# Patient Record
Sex: FEMALE | Race: WHITE | URBAN - METROPOLITAN AREA
[De-identification: names, ages, dates, MRNs, and addresses within clinical notes are randomized per-mention and may not be internally consistent; named-entity substitution may affect disease eponyms.]

---

## 2020-11-05 ENCOUNTER — OFFICE VISIT (OUTPATIENT)
Dept: URBAN - METROPOLITAN AREA CLINIC 31 | Facility: CLINIC | Age: 85
End: 2020-11-05
Payer: MEDICARE

## 2020-11-05 PROCEDURE — 92134 CPTRZ OPH DX IMG PST SGM RTA: CPT | Performed by: OPHTHALMOLOGY

## 2020-11-05 PROCEDURE — 92014 COMPRE OPH EXAM EST PT 1/>: CPT | Performed by: OPHTHALMOLOGY

## 2020-11-05 ASSESSMENT — INTRAOCULAR PRESSURE
OS: 12
OD: 13

## 2020-11-05 NOTE — IMPRESSION/PLAN
Impression: Vitreous degeneration, bilateral: H43.813. Plan: Patient notes floaters. Exam demonstrates a PVD without any RD or RT on exam.  The floaters are not debilitating to the patient and do not warrant surgery. Reassurance provided. Precautions discussed with the patient.

## 2020-11-05 NOTE — IMPRESSION/PLAN
Impression: Exudative age-rel mclr degn, bi, with actv chrdl neovas: H35.3231. Bilateral. Status: Symptomatic.
h/o Eylea OU q3m in [place], s/p last Eylea 06/2020 OD: atrophy with SRH
OS: drusen and RPE change
-s/p Avastin 10/01/20 Plan: Exam and OCT demonstrate continued SRH OD and tr SRF/IRF OS. Recommend switch to intravitreal Eylea injection today OU. R/B/A discussed and patient elects to proceed. Intravitreal Eylea injection was performed in both eyes in clinic without complication.   

RTC 4-6 weeks re-eval with OCT OU, possible Eylea

## 2020-11-05 NOTE — IMPRESSION/PLAN
Impression: Age-related nuclear cataract, bilateral: H25.13. Bilateral. Status: Symptomatic. Plan: Vision tested worse; Exam demonstrates a worse cataract on exam. Patient is cleared from a retina standpoint to proceed with CE/IOL. Discussed that prognosis is guarded OD>OS given AMD status.

## 2020-12-10 ENCOUNTER — OFFICE VISIT (OUTPATIENT)
Dept: URBAN - METROPOLITAN AREA CLINIC 31 | Facility: CLINIC | Age: 85
End: 2020-12-10
Payer: MEDICARE

## 2020-12-10 PROCEDURE — 92134 CPTRZ OPH DX IMG PST SGM RTA: CPT | Performed by: OPHTHALMOLOGY

## 2020-12-10 PROCEDURE — 92012 INTRM OPH EXAM EST PATIENT: CPT | Performed by: OPHTHALMOLOGY

## 2020-12-10 ASSESSMENT — INTRAOCULAR PRESSURE
OS: 17
OD: 21

## 2020-12-10 NOTE — IMPRESSION/PLAN
Impression: Age-related nuclear cataract, bilateral: H25.13. Bilateral. Status: Symptomatic. Plan: Exam demonstrates a worse cataract on exam. Patient is cleared from a retina standpoint to proceed with CE/IOL. Discussed that prognosis is guarded OD>OS given AMD status. Patient is scheduled for cataract surgery OD then OS over the next few weeks.

## 2020-12-10 NOTE — IMPRESSION/PLAN
Impression: Exudative age-rel mclr degn, bi, with actv chrdl neovas: H35.3231. Bilateral. Status: Symptomatic.
h/o Eylea OU q3m in [place], s/p last Eylea 11/05/20 OD: atrophy with SRH
OS: drusen and RPE change
-s/p Avastin 10/01/20 Plan: Exam and OCT demonstrate improved SRH OD and improving tr SRF/IRF OS. Recommend intravitreal Eylea injection today OU. Will follow closely as cataract surgery is upcoming OU. R/B/A discussed and patient elects to proceed. Intravitreal Eylea injection was performed in both eyes in clinic without complication.   

RTC 6 weeks re-eval with OCT OU, possible Eylea

## 2021-01-21 ENCOUNTER — OFFICE VISIT (OUTPATIENT)
Dept: URBAN - METROPOLITAN AREA CLINIC 31 | Facility: CLINIC | Age: 86
End: 2021-01-21
Payer: MEDICARE

## 2021-01-21 DIAGNOSIS — H25.13 AGE-RELATED NUCLEAR CATARACT, BILATERAL: ICD-10-CM

## 2021-01-21 DIAGNOSIS — H35.3231 EXUDATIVE AGE-REL MCLR DEGN, BI, WITH ACTV CHRDL NEOVAS: ICD-10-CM

## 2021-01-21 PROCEDURE — 92014 COMPRE OPH EXAM EST PT 1/>: CPT | Performed by: OPHTHALMOLOGY

## 2021-01-21 PROCEDURE — 92134 CPTRZ OPH DX IMG PST SGM RTA: CPT | Performed by: OPHTHALMOLOGY

## 2021-01-21 ASSESSMENT — INTRAOCULAR PRESSURE
OS: 13
OD: 14

## 2021-01-21 NOTE — IMPRESSION/PLAN
Impression: Presence of intraocular lens: Z96.1. Plan: Lens in good place. Observe. Cleared from retina standpoint to proceed with Mrx.

## 2021-01-21 NOTE — IMPRESSION/PLAN
Impression: Exudative age-rel mclr degn, bi, with actv chrdl neovas: H35.3231. Bilateral. Status: Symptomatic.
h/o Eylea OU q3m in [place], s/p last Eylea 11/05/20 OD: atrophy with SRH
OS: drusen and RPE change
-s/p Avastin 10/01/20 Plan: Exam and OCT demonstrate improved SRH OD and improving tr SRF/IRF OS. Recommend intravitreal Eylea injection today OU. There is no worsening with cataract surgery OU. R/B/A discussed and patient elects to proceed. Intravitreal Eylea injection was performed in both eyes in clinic without complication.   

RTC 6 weeks re-eval with OCT OU, possible Eylea

## 2021-03-04 ENCOUNTER — OFFICE VISIT (OUTPATIENT)
Dept: URBAN - METROPOLITAN AREA CLINIC 31 | Facility: CLINIC | Age: 86
End: 2021-03-04
Payer: MEDICARE

## 2021-03-04 PROCEDURE — 92134 CPTRZ OPH DX IMG PST SGM RTA: CPT | Performed by: OPHTHALMOLOGY

## 2021-03-04 PROCEDURE — 92012 INTRM OPH EXAM EST PATIENT: CPT | Performed by: OPHTHALMOLOGY

## 2021-03-04 ASSESSMENT — INTRAOCULAR PRESSURE
OD: 8
OS: 8

## 2021-03-04 NOTE — IMPRESSION/PLAN
Impression: Exudative age-rel mclr degn, bi, with actv chrdl neovas: H35.3231. Bilateral. Status: Symptomatic.
h/o Eylea OU q3m in [place], s/p last Eylea RCA 01/21/21 OD: atrophy with SRH
OS: drusen and RPE change
-s/p Avastin 10/01/20 Plan: Exam and OCT demonstrate improved SRH OD and improving tr SRF/IRF OS. Recommend intravitreal Eylea injection today OU and extend next visit to 8 wks. There is no worsening with cataract surgery OU. R/B/A discussed and patient elects to proceed. Intravitreal Eylea injection was performed in both eyes in clinic without complication.   

RTC 8 weeks re-eval with OCT OU, possible Eylea

## 2021-05-06 ENCOUNTER — OFFICE VISIT (OUTPATIENT)
Dept: URBAN - METROPOLITAN AREA CLINIC 31 | Facility: CLINIC | Age: 86
End: 2021-05-06
Payer: MEDICARE

## 2021-05-06 PROCEDURE — 92012 INTRM OPH EXAM EST PATIENT: CPT | Performed by: OPHTHALMOLOGY

## 2021-05-06 PROCEDURE — 92134 CPTRZ OPH DX IMG PST SGM RTA: CPT | Performed by: OPHTHALMOLOGY

## 2021-05-06 ASSESSMENT — INTRAOCULAR PRESSURE
OS: 12
OD: 13

## 2021-05-06 NOTE — IMPRESSION/PLAN
Impression: Presence of intraocular lens: Z96.1.
- Dr. Mehnaz Clemens: Lens in good place. Observe.   Doing well with new Mrx

## 2021-05-06 NOTE — IMPRESSION/PLAN
Impression: Exudative age-rel mclr degn, bi, with actv chrdl neovas: H35.3231. Bilateral. Status: Symptomatic.
h/o Eylea OU q3m in [place], s/p last Eylea last 03/04/2021-RCA
OD: atrophy with SRH
OS: drusen and RPE change
-s/p Avastin 10/01/20 Plan: Exam and OCT demonstrate improved SRH OD and improving tr SRF/IRF OS. Recommend intravitreal Eylea injection today OU and maintaining next visit to 8 wks. There is no worsening with cataract surgery OU. R/B/A discussed and patient elects to proceed. Intravitreal Eylea injection was performed in both eyes in clinic without complication.   

RTC 8 weeks re-eval with OCT OU, possible Eylea

## 2021-07-01 ENCOUNTER — OFFICE VISIT (OUTPATIENT)
Dept: URBAN - METROPOLITAN AREA CLINIC 31 | Facility: CLINIC | Age: 86
End: 2021-07-01
Payer: MEDICARE

## 2021-07-01 PROCEDURE — 92134 CPTRZ OPH DX IMG PST SGM RTA: CPT | Performed by: OPHTHALMOLOGY

## 2021-07-01 PROCEDURE — 92012 INTRM OPH EXAM EST PATIENT: CPT | Performed by: OPHTHALMOLOGY

## 2021-07-01 ASSESSMENT — INTRAOCULAR PRESSURE
OD: 8
OS: 9

## 2021-07-01 NOTE — IMPRESSION/PLAN
Impression: Presence of intraocular lens: Z96.1.
- Dr. Sri Rocha: Lens in good place. Observe.   Doing well with new Mrx Order extended.

## 2021-07-01 NOTE — IMPRESSION/PLAN
Impression: Exudative age-rel mclr degn, bi, with actv chrdl neovas: H35.3231. Bilateral. Status: Symptomatic.
h/o Eylea OU q3m in [place], s/p last Eylea last 05/06/2021-RCA
OD: atrophy with SRH
OS: drusen and RPE change
-s/p Avastin 10/01/20 Plan: Exam and OCT demonstrate improved SRH OD and improving tr SRF/IRF OS. Recommend intravitreal Eylea injection today OU and maintaining next visit to 8 wks. There is no worsening with cataract surgery OU. R/B/A discussed and patient elects to proceed. Intravitreal Eylea injection was performed in both eyes in clinic without complication.   

RTC 8 weeks re-eval with OCT OU, possible Eylea

## 2021-08-26 ENCOUNTER — OFFICE VISIT (OUTPATIENT)
Dept: URBAN - METROPOLITAN AREA CLINIC 31 | Facility: CLINIC | Age: 86
End: 2021-08-26
Payer: MEDICARE

## 2021-08-26 PROCEDURE — 92134 CPTRZ OPH DX IMG PST SGM RTA: CPT | Performed by: OPHTHALMOLOGY

## 2021-08-26 PROCEDURE — 92012 INTRM OPH EXAM EST PATIENT: CPT | Performed by: OPHTHALMOLOGY

## 2021-08-26 ASSESSMENT — INTRAOCULAR PRESSURE
OD: 10
OS: 10

## 2021-08-26 NOTE — IMPRESSION/PLAN
Impression: Dry eye syndrome of bilateral lacrimal glands: H04.123. Plan: Patient notes irritation. Exam demonstrates worse PEE. Discussed R/B/A of ATs, PFATs, Restasis, vs punctal plugs.  Rec ATs

## 2021-08-26 NOTE — IMPRESSION/PLAN
Impression: Presence of intraocular lens: Z96.1.
- Dr. Oleary Dom: Lens in good place. Observe.   Doing well with new Mrx

## 2021-08-26 NOTE — IMPRESSION/PLAN
Impression: Exudative age-rel mclr degn, bi, with actv chrdl neovas: H35.3231. Bilateral. Status: Symptomatic.
h/o Eylea OU q3m in [place], s/p last Eylea last 07/01/2021-RCA
OD: atrophy with SRH
OS: drusen and RPE change
-s/p Avastin 10/01/20 Plan: Exam and OCT demonstrate improved SRH OD and improving tr SRF/IRF OS. Recommend intravitreal Eylea injection today OU and maintaining next visit to 8 wks. There is no worsening with cataract surgery OU. R/B/A discussed and patient elects to proceed. Intravitreal Eylea injection was performed in both eyes in clinic without complication.   

RTC 8 weeks re-eval with OCT OU, possible Eylea

## 2021-10-21 ENCOUNTER — OFFICE VISIT (OUTPATIENT)
Dept: URBAN - METROPOLITAN AREA CLINIC 31 | Facility: CLINIC | Age: 86
End: 2021-10-21
Payer: MEDICARE

## 2021-10-21 PROCEDURE — 92134 CPTRZ OPH DX IMG PST SGM RTA: CPT | Performed by: OPHTHALMOLOGY

## 2021-10-21 PROCEDURE — 92012 INTRM OPH EXAM EST PATIENT: CPT | Performed by: OPHTHALMOLOGY

## 2021-10-21 ASSESSMENT — INTRAOCULAR PRESSURE
OS: 12
OD: 11

## 2021-10-21 NOTE — IMPRESSION/PLAN
Impression: Presence of intraocular lens: Z96.1.
- Dr. Tulio Bagley: Lens in good place. Observe.   Doing well with new Mrx

## 2021-10-21 NOTE — IMPRESSION/PLAN
Impression: Exudative age-rel mclr degn, bi, with actv chrdl neovas: H35.3231. Bilateral. Status: Symptomatic.
h/o Eylea OU q3m in [place], s/p last Eylea last 08/26/2021-RCA
OD: atrophy with SRH
OS: drusen and RPE change
-s/p Avastin 10/01/20 Plan: Exam and OCT demonstrate improved SRH OD and improving tr SRF/IRF OS. Recommend intravitreal Eylea injection today OU and maintaining next visit to 8 wks. There is no worsening with cataract surgery OU. R/B/A discussed and patient elects to proceed. Intravitreal Eylea injection was performed in both eyes in clinic without complication.   

RTC 8 weeks re-eval with OCT OU, possible Eylea

## 2021-12-16 ENCOUNTER — OFFICE VISIT (OUTPATIENT)
Dept: URBAN - METROPOLITAN AREA CLINIC 31 | Facility: CLINIC | Age: 86
End: 2021-12-16
Payer: MEDICARE

## 2021-12-16 DIAGNOSIS — H43.813 VITREOUS DEGENERATION, BILATERAL: ICD-10-CM

## 2021-12-16 DIAGNOSIS — H04.123 DRY EYE SYNDROME OF BILATERAL LACRIMAL GLANDS: ICD-10-CM

## 2021-12-16 PROCEDURE — 92134 CPTRZ OPH DX IMG PST SGM RTA: CPT | Performed by: OPHTHALMOLOGY

## 2021-12-16 PROCEDURE — 92012 INTRM OPH EXAM EST PATIENT: CPT | Performed by: OPHTHALMOLOGY

## 2021-12-16 ASSESSMENT — INTRAOCULAR PRESSURE
OS: 13
OD: 12

## 2021-12-16 NOTE — IMPRESSION/PLAN
Impression: Exudative age-rel mclr degn, bi, with actv chrdl neovas: H35.3231. Bilateral. Status: Symptomatic.
h/o Eylea OU q3m in [place], s/p last Eylea last 10/21/2021-RCA
OD: atrophy with SRH
OS: drusen and RPE change
-s/p Avastin 10/01/20 Plan: Exam and OCT demonstrate improved SRH OD and improving tr SRF/IRF OS. Recommend intravitreal Eylea injection today OU and extend next visit to 10 wks. There is no worsening with cataract surgery OU. R/B/A discussed and patient elects to proceed. Intravitreal Eylea injection was performed in both eyes in clinic without complication.   

RTC 10 weeks re-eval with OCT OU, possible Eylea

## 2021-12-16 NOTE — IMPRESSION/PLAN
Impression: Presence of intraocular lens: Z96.1.
- Dr. Gonzalez Lady: Lens in good place. Observe.   Doing well with new Mrx

## 2022-02-24 ENCOUNTER — OFFICE VISIT (OUTPATIENT)
Dept: URBAN - METROPOLITAN AREA CLINIC 31 | Facility: CLINIC | Age: 87
End: 2022-02-24
Payer: MEDICARE

## 2022-02-24 DIAGNOSIS — Z96.1 PRESENCE OF INTRAOCULAR LENS: ICD-10-CM

## 2022-02-24 PROCEDURE — 99214 OFFICE O/P EST MOD 30 MIN: CPT | Performed by: OPHTHALMOLOGY

## 2022-02-24 PROCEDURE — 92134 CPTRZ OPH DX IMG PST SGM RTA: CPT | Performed by: OPHTHALMOLOGY

## 2022-02-24 ASSESSMENT — INTRAOCULAR PRESSURE
OS: 18
OD: 11

## 2022-02-24 NOTE — IMPRESSION/PLAN
Impression: Exudative age-rel mclr degn, bi, with actv chrdl neovas: H35.3231. Bilateral. Status: Symptomatic. ~h/o Eylea OU q3m in [place], 
-s/p last Eylea last 12/16/2021-RCA
OD: atrophy with SRH
OS: drusen and RPE change
-s/p Avastin 10/01/20 Plan: Exam and OCT demonstrate improved SRH OD and improving tr SRF/IRF OS. Recommend intravitreal Eylea injection today OU and extend next visit to 12 wks. There is no worsening with cataract surgery OU. R/B/A discussed and patient elects to proceed. Intravitreal Eylea injection was performed in both eyes in clinic without complication.   

RTC 12 weeks re-eval with OCT OU, possible Eylea

## 2022-02-24 NOTE — IMPRESSION/PLAN
Impression: Presence of intraocular lens: Z96.1.
- Dr. Devi Centeno: Lens in good place. + PCO OS.   Rec follow up with Dr. Salvador Chin

## 2022-05-20 ENCOUNTER — OFFICE VISIT (OUTPATIENT)
Dept: URBAN - METROPOLITAN AREA CLINIC 41 | Facility: CLINIC | Age: 87
End: 2022-05-20
Payer: MEDICARE

## 2022-05-20 DIAGNOSIS — H35.3231 EXUDATIVE AGE-REL MCLR DEGN, BI, WITH ACTV CHRDL NEOVAS: Primary | ICD-10-CM

## 2022-05-20 DIAGNOSIS — H04.123 DRY EYE SYNDROME OF BILATERAL LACRIMAL GLANDS: ICD-10-CM

## 2022-05-20 DIAGNOSIS — H43.813 VITREOUS DEGENERATION, BILATERAL: ICD-10-CM

## 2022-05-20 DIAGNOSIS — Z96.1 PRESENCE OF INTRAOCULAR LENS: ICD-10-CM

## 2022-05-20 PROCEDURE — 92012 INTRM OPH EXAM EST PATIENT: CPT | Performed by: OPHTHALMOLOGY

## 2022-05-20 PROCEDURE — 92134 CPTRZ OPH DX IMG PST SGM RTA: CPT | Performed by: OPHTHALMOLOGY

## 2022-05-20 ASSESSMENT — INTRAOCULAR PRESSURE
OS: 13
OD: 13

## 2022-05-20 NOTE — IMPRESSION/PLAN
Impression: Presence of intraocular lens: Z96.1.
- Dr. David Diaz: Lens in good place. + PCO OS.   Rec follow up with Dr. Zander Zamora

## 2022-05-20 NOTE — IMPRESSION/PLAN
Impression: Exudative age-rel mclr degn, bi, with actv chrdl neovas: H35.3231. Bilateral. Status: Symptomatic. ~h/o Eylea OU q3m in T[place], 
-s/p last Eylea last 2/24/2022-RCA
OD: atrophy with SRH
OS: drusen and RPE change
-s/p Avastin 10/01/20 Plan: Exam and OCT demonstrate improved SRH OD and improving tr SRF/IRF OS. Recommend intravitreal Eylea injection today OU and extend next visit to 14 wks. There is no worsening with cataract surgery OU. R/B/A discussed and patient elects to proceed. Intravitreal Eylea injection was performed in both eyes in clinic without complication.   

RTC 14 weeks re-eval with OCT OU, possible Eylea

## 2022-08-11 ENCOUNTER — OFFICE VISIT (OUTPATIENT)
Dept: URBAN - METROPOLITAN AREA CLINIC 31 | Facility: CLINIC | Age: 87
End: 2022-08-11
Payer: MEDICARE

## 2022-08-11 DIAGNOSIS — Z96.1 PRESENCE OF INTRAOCULAR LENS: ICD-10-CM

## 2022-08-11 DIAGNOSIS — H04.123 DRY EYE SYNDROME OF BILATERAL LACRIMAL GLANDS: ICD-10-CM

## 2022-08-11 DIAGNOSIS — H43.813 VITREOUS DEGENERATION, BILATERAL: ICD-10-CM

## 2022-08-11 DIAGNOSIS — H35.3231 EXUDATIVE AGE-REL MCLR DEGN, BI, WITH ACTV CHRDL NEOVAS: Primary | ICD-10-CM

## 2022-08-11 PROCEDURE — 92012 INTRM OPH EXAM EST PATIENT: CPT | Performed by: OPHTHALMOLOGY

## 2022-08-11 PROCEDURE — 92134 CPTRZ OPH DX IMG PST SGM RTA: CPT | Performed by: OPHTHALMOLOGY

## 2022-08-11 ASSESSMENT — INTRAOCULAR PRESSURE
OD: 13
OS: 13

## 2022-08-11 NOTE — IMPRESSION/PLAN
Impression: Presence of intraocular lens: Z96.1.
- Dr. Lisa Cam: Lens in good place. + PCO OS. Remains mild. Observe.

## 2022-08-11 NOTE — IMPRESSION/PLAN
Impression: Exudative age-rel mclr degn, bi, with actv chrdl neovas: H35.3231. Bilateral. Status: Symptomatic. ~h/o Eylea OU q3m in Tone
-s/p Avastin 10/01/2020
-s/p Eylea last 05/20/2022-RCA Plan: Exam and OCT demonstrate improved SRH OD and improving tr SRF/IRF OS. Recommend intravitreal Eylea injection today OU and extend next visit to 14 wks. There is no worsening with cataract surgery OU. R/B/A discussed and patient elects to proceed. Intravitreal Eylea injection was performed in both eyes in clinic without complication.   

RTC 14 weeks re-eval with OCT OU, possible Eylea

## 2022-11-18 ENCOUNTER — OFFICE VISIT (OUTPATIENT)
Dept: URBAN - METROPOLITAN AREA CLINIC 41 | Facility: CLINIC | Age: 87
End: 2022-11-18
Payer: MEDICARE

## 2022-11-18 DIAGNOSIS — H35.3231 EXUDATIVE AGE-REL MCLR DEGN, BI, WITH ACTV CHRDL NEOVAS: Primary | ICD-10-CM

## 2022-11-18 DIAGNOSIS — H43.813 VITREOUS DEGENERATION, BILATERAL: ICD-10-CM

## 2022-11-18 DIAGNOSIS — Z96.1 PRESENCE OF INTRAOCULAR LENS: ICD-10-CM

## 2022-11-18 DIAGNOSIS — H04.123 DRY EYE SYNDROME OF BILATERAL LACRIMAL GLANDS: ICD-10-CM

## 2022-11-18 PROCEDURE — 99214 OFFICE O/P EST MOD 30 MIN: CPT | Performed by: OPHTHALMOLOGY

## 2022-11-18 PROCEDURE — 92134 CPTRZ OPH DX IMG PST SGM RTA: CPT | Performed by: OPHTHALMOLOGY

## 2022-11-18 ASSESSMENT — INTRAOCULAR PRESSURE
OS: 13
OD: 14

## 2022-11-18 NOTE — IMPRESSION/PLAN
Impression: Exudative age-rel mclr degn, bi, with actv chrdl neovas: H35.3231. Bilateral. Status: Symptomatic. ~h/o Eylea OU q3m in [place]
-s/p Avastin 10/01/2020
-s/p Eylea last 08/11/2022-RCA Plan: Exam and OCT demonstrate improved SRH OD and slightly worse IRF OS. Recommend intravitreal Eylea injection today OU and taper next visit to 12 wks. There is no worsening with cataract surgery OU. R/B/A discussed and patient elects to proceed. Intravitreal Eylea injection was performed in both eyes in clinic without complication.   

RTC 12 weeks re-eval with OCT OU, possible Eylea

## 2022-11-18 NOTE — IMPRESSION/PLAN
Impression: Presence of intraocular lens: Z96.1.
- Dr. Teresa Posey: Lens in good place. + PCO OS, worse today.   rec eval with Dr Ezekiel Horton

## 2023-02-08 ENCOUNTER — OFFICE VISIT (OUTPATIENT)
Dept: URBAN - METROPOLITAN AREA CLINIC 41 | Facility: CLINIC | Age: 88
End: 2023-02-08
Payer: MEDICARE

## 2023-02-08 DIAGNOSIS — Z96.1 PRESENCE OF INTRAOCULAR LENS: ICD-10-CM

## 2023-02-08 DIAGNOSIS — H43.813 VITREOUS DEGENERATION, BILATERAL: ICD-10-CM

## 2023-02-08 DIAGNOSIS — H04.123 DRY EYE SYNDROME OF BILATERAL LACRIMAL GLANDS: ICD-10-CM

## 2023-02-08 DIAGNOSIS — H35.3231 EXUDATIVE AGE-REL MCLR DEGN, BI, WITH ACTV CHRDL NEOVAS: Primary | ICD-10-CM

## 2023-02-08 PROCEDURE — 92012 INTRM OPH EXAM EST PATIENT: CPT | Performed by: OPHTHALMOLOGY

## 2023-02-08 PROCEDURE — 92134 CPTRZ OPH DX IMG PST SGM RTA: CPT | Performed by: OPHTHALMOLOGY

## 2023-02-08 ASSESSMENT — INTRAOCULAR PRESSURE
OS: 14
OD: 17

## 2023-02-08 NOTE — IMPRESSION/PLAN
Impression: Exudative age-rel mclr degn, bi, with actv chrdl neovas: H35.3231. Bilateral. Status: Symptomatic. ~h/o Eylea OU q 3mo in Tone
-s/p Avastin 10/01/2020
-s/p Eylea last 11/18/2022-RCA Plan: Exam and OCT demonstrate improved SRH OD and slightly worse IRF OS. Recommend intravitreal Eylea injection today OU and taper next visit to 12 wks. There is no worsening with cataract surgery OU. R/B/A discussed and patient elects to proceed. Intravitreal Eylea injection was performed in both eyes in clinic without complication.   

RTC 12 weeks re-eval with OCT OU, possible Eylea

## 2023-02-08 NOTE — IMPRESSION/PLAN
Impression: Presence of intraocular lens: Z96.1.
- Dr. Willie Garcia: Lens in good place. + PCO OS, worse today.   rec eval with Dr Em Bay

## 2023-05-03 ENCOUNTER — OFFICE VISIT (OUTPATIENT)
Facility: LOCATION | Age: 88
End: 2023-05-03
Payer: MEDICARE

## 2023-05-03 DIAGNOSIS — H04.123 DRY EYE SYNDROME OF BILATERAL LACRIMAL GLANDS: ICD-10-CM

## 2023-05-03 DIAGNOSIS — H35.3231 EXUDATIVE AGE-REL MCLR DEGN, BI, WITH ACTV CHRDL NEOVAS: Primary | ICD-10-CM

## 2023-05-03 DIAGNOSIS — Z96.1 PRESENCE OF INTRAOCULAR LENS: ICD-10-CM

## 2023-05-03 DIAGNOSIS — H43.813 VITREOUS DEGENERATION, BILATERAL: ICD-10-CM

## 2023-05-03 PROCEDURE — 92134 CPTRZ OPH DX IMG PST SGM RTA: CPT | Performed by: OPHTHALMOLOGY

## 2023-05-03 PROCEDURE — 92012 INTRM OPH EXAM EST PATIENT: CPT | Performed by: OPHTHALMOLOGY

## 2023-05-03 ASSESSMENT — INTRAOCULAR PRESSURE
OS: 14
OD: 13

## 2023-05-03 NOTE — IMPRESSION/PLAN
Impression: Presence of intraocular lens: Z96.1.
- Dr. Fisher Gloss: Lens in good place. + PCO OS, worse today.   rec eval with Dr Jeanmarie Worthy

## 2023-05-03 NOTE — IMPRESSION/PLAN
Impression: Exudative age-rel mclr degn, bi, with actv chrdl neovas: H35.3231. Bilateral. Status: Symptomatic. ~h/o Eylea OU q 3mo in Tone
-s/p Avastin 10/01/2020
-s/p Eylea last 02/08/2023-RCA Plan: Exam and OCT demonstrate improved SRH OD and improved IRF OS with taper to 12 wks. Recommend intravitreal Eylea injection today OU and maintain next visit to 12 wks. There is no worsening with cataract surgery OU. R/B/A discussed and patient elects to proceed. Intravitreal Eylea injection was performed in both eyes in clinic without complication.   

RTC 12 weeks re-eval with OCT OU, possible Eylea

## 2023-07-26 ENCOUNTER — OFFICE VISIT (OUTPATIENT)
Facility: LOCATION | Age: 88
End: 2023-07-26
Payer: MEDICARE

## 2023-07-26 DIAGNOSIS — H35.3231 EXUDATIVE AGE-REL MCLR DEGN, BI, WITH ACTV CHRDL NEOVAS: Primary | ICD-10-CM

## 2023-07-26 DIAGNOSIS — H04.123 DRY EYE SYNDROME OF BILATERAL LACRIMAL GLANDS: ICD-10-CM

## 2023-07-26 DIAGNOSIS — Z96.1 PRESENCE OF INTRAOCULAR LENS: ICD-10-CM

## 2023-07-26 DIAGNOSIS — H43.813 VITREOUS DEGENERATION, BILATERAL: ICD-10-CM

## 2023-07-26 PROCEDURE — 92014 COMPRE OPH EXAM EST PT 1/>: CPT | Performed by: OPHTHALMOLOGY

## 2023-07-26 PROCEDURE — 92134 CPTRZ OPH DX IMG PST SGM RTA: CPT | Performed by: OPHTHALMOLOGY

## 2023-07-26 ASSESSMENT — INTRAOCULAR PRESSURE
OD: 16
OS: 16

## 2023-10-04 ENCOUNTER — OFFICE VISIT (OUTPATIENT)
Dept: URBAN - METROPOLITAN AREA CLINIC 41 | Facility: CLINIC | Age: 88
End: 2023-10-04
Payer: MEDICARE

## 2023-10-04 DIAGNOSIS — H43.813 VITREOUS DEGENERATION, BILATERAL: ICD-10-CM

## 2023-10-04 DIAGNOSIS — H40.9 GLAUCOMA: ICD-10-CM

## 2023-10-04 DIAGNOSIS — H35.3231 EXUDATIVE AGE-REL MCLR DEGN, BI, WITH ACTV CHRDL NEOVAS: Primary | ICD-10-CM

## 2023-10-04 DIAGNOSIS — Z96.1 PRESENCE OF INTRAOCULAR LENS: ICD-10-CM

## 2023-10-04 DIAGNOSIS — H04.123 DRY EYE SYNDROME OF BILATERAL LACRIMAL GLANDS: ICD-10-CM

## 2023-10-04 PROCEDURE — 92134 CPTRZ OPH DX IMG PST SGM RTA: CPT | Performed by: OPHTHALMOLOGY

## 2023-10-04 PROCEDURE — 92012 INTRM OPH EXAM EST PATIENT: CPT | Performed by: OPHTHALMOLOGY

## 2023-10-04 ASSESSMENT — INTRAOCULAR PRESSURE
OD: 13
OS: 13

## 2023-12-27 ENCOUNTER — OFFICE VISIT (OUTPATIENT)
Dept: URBAN - METROPOLITAN AREA CLINIC 41 | Facility: CLINIC | Age: 88
End: 2023-12-27
Payer: MEDICARE

## 2023-12-27 DIAGNOSIS — H35.3231 EXUDATIVE AGE-REL MCLR DEGN, BI, WITH ACTV CHRDL NEOVAS: Primary | ICD-10-CM

## 2023-12-27 DIAGNOSIS — Z96.1 PRESENCE OF INTRAOCULAR LENS: ICD-10-CM

## 2023-12-27 DIAGNOSIS — H04.123 DRY EYE SYNDROME OF BILATERAL LACRIMAL GLANDS: ICD-10-CM

## 2023-12-27 DIAGNOSIS — H40.9 GLAUCOMA: ICD-10-CM

## 2023-12-27 DIAGNOSIS — H43.813 VITREOUS DEGENERATION, BILATERAL: ICD-10-CM

## 2023-12-27 PROCEDURE — 92134 CPTRZ OPH DX IMG PST SGM RTA: CPT | Performed by: OPHTHALMOLOGY

## 2023-12-27 PROCEDURE — 92014 COMPRE OPH EXAM EST PT 1/>: CPT | Performed by: OPHTHALMOLOGY

## 2023-12-27 ASSESSMENT — INTRAOCULAR PRESSURE
OS: 16
OD: 19

## 2024-03-05 NOTE — IMPRESSION/PLAN
Impression: Vitreous degeneration, bilateral: H43.813. Plan: Patient notes floaters. Exam demonstrates a PVD without any RD or RT on exam.  The floaters are not debilitating to the patient and do not warrant surgery. Reassurance provided. Precautions discussed with the patient. Render Risk Assessment In Note?: no Detail Level: Zone Other (Free Text): hx of neck problems.  She has seen neurologist in the past and has had spinal x-rays but after the x-ray she fell and hit her neck so has done more damage since last imaging.  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  now taking an over the counter supplement that is helping the feet somewhat.  \\n\\nShe states the clobetasol scalp solution was helpful for the itch but did not get rid of the problem.  \\n\\nThe itch keeps her up at night. Note Text (......Xxx Chief Complaint.): This diagnosis correlates with the

## 2024-03-06 ENCOUNTER — OFFICE VISIT (OUTPATIENT)
Dept: URBAN - METROPOLITAN AREA CLINIC 41 | Facility: CLINIC | Age: 89
End: 2024-03-06
Payer: MEDICARE

## 2024-03-06 DIAGNOSIS — H43.813 VITREOUS DEGENERATION, BILATERAL: ICD-10-CM

## 2024-03-06 DIAGNOSIS — H40.9 GLAUCOMA: ICD-10-CM

## 2024-03-06 DIAGNOSIS — Z96.1 PRESENCE OF INTRAOCULAR LENS: ICD-10-CM

## 2024-03-06 DIAGNOSIS — H35.3231 EXUDATIVE AGE-REL MCLR DEGN, BI, WITH ACTV CHRDL NEOVAS: Primary | ICD-10-CM

## 2024-03-06 DIAGNOSIS — H04.123 DRY EYE SYNDROME OF BILATERAL LACRIMAL GLANDS: ICD-10-CM

## 2024-03-06 PROCEDURE — 92134 CPTRZ OPH DX IMG PST SGM RTA: CPT | Performed by: OPHTHALMOLOGY

## 2024-03-06 PROCEDURE — 99214 OFFICE O/P EST MOD 30 MIN: CPT | Performed by: OPHTHALMOLOGY

## 2024-03-06 ASSESSMENT — INTRAOCULAR PRESSURE
OS: 17
OD: 15

## 2024-05-15 ENCOUNTER — OFFICE VISIT (OUTPATIENT)
Dept: URBAN - METROPOLITAN AREA CLINIC 41 | Facility: CLINIC | Age: 89
End: 2024-05-15
Payer: MEDICARE

## 2024-05-15 DIAGNOSIS — Z96.1 PRESENCE OF INTRAOCULAR LENS: ICD-10-CM

## 2024-05-15 DIAGNOSIS — H04.123 DRY EYE SYNDROME OF BILATERAL LACRIMAL GLANDS: ICD-10-CM

## 2024-05-15 DIAGNOSIS — H43.813 VITREOUS DEGENERATION, BILATERAL: ICD-10-CM

## 2024-05-15 DIAGNOSIS — H35.3231 EXUDATIVE AGE-RELATED MACULAR DEGENERATION, BILATERAL, WITH ACTIVE CHOROIDAL NEOVASCULARIZATION: Primary | ICD-10-CM

## 2024-05-15 DIAGNOSIS — H40.9 GLAUCOMA: ICD-10-CM

## 2024-05-15 PROCEDURE — 92134 CPTRZ OPH DX IMG PST SGM RTA: CPT | Performed by: OPHTHALMOLOGY

## 2024-05-15 PROCEDURE — 92014 COMPRE OPH EXAM EST PT 1/>: CPT | Performed by: OPHTHALMOLOGY

## 2024-05-15 ASSESSMENT — INTRAOCULAR PRESSURE
OD: 12
OS: 12

## 2024-07-24 ENCOUNTER — OFFICE VISIT (OUTPATIENT)
Dept: URBAN - METROPOLITAN AREA CLINIC 41 | Facility: CLINIC | Age: 89
End: 2024-07-24
Payer: MEDICARE

## 2024-07-24 DIAGNOSIS — Z96.1 PRESENCE OF INTRAOCULAR LENS: ICD-10-CM

## 2024-07-24 DIAGNOSIS — H43.813 VITREOUS DEGENERATION, BILATERAL: ICD-10-CM

## 2024-07-24 DIAGNOSIS — H40.9 GLAUCOMA: ICD-10-CM

## 2024-07-24 DIAGNOSIS — H35.3231 EXUDATIVE AGE-REL MCLR DEGN, BI, WITH ACTV CHRDL NEOVAS: Primary | ICD-10-CM

## 2024-07-24 DIAGNOSIS — H04.123 DRY EYE SYNDROME OF BILATERAL LACRIMAL GLANDS: ICD-10-CM

## 2024-07-24 PROCEDURE — 92014 COMPRE OPH EXAM EST PT 1/>: CPT | Performed by: OPHTHALMOLOGY

## 2024-07-24 ASSESSMENT — INTRAOCULAR PRESSURE
OD: 14
OS: 15

## 2024-10-11 ENCOUNTER — OFFICE VISIT (OUTPATIENT)
Dept: URBAN - METROPOLITAN AREA CLINIC 41 | Facility: CLINIC | Age: 89
End: 2024-10-11
Payer: MEDICARE

## 2024-10-11 DIAGNOSIS — H04.123 DRY EYE SYNDROME OF BILATERAL LACRIMAL GLANDS: ICD-10-CM

## 2024-10-11 DIAGNOSIS — H35.3231 EXUDATIVE AGE-RELATED MACULAR DEGENERATION, BILATERAL, WITH ACTIVE CHOROIDAL NEOVASCULARIZATION: Primary | ICD-10-CM

## 2024-10-11 DIAGNOSIS — H40.9 GLAUCOMA: ICD-10-CM

## 2024-10-11 DIAGNOSIS — H43.813 VITREOUS DEGENERATION, BILATERAL: ICD-10-CM

## 2024-10-11 DIAGNOSIS — Z96.1 PRESENCE OF INTRAOCULAR LENS: ICD-10-CM

## 2024-10-11 PROCEDURE — 92134 CPTRZ OPH DX IMG PST SGM RTA: CPT | Performed by: OPHTHALMOLOGY

## 2024-10-11 PROCEDURE — 99214 OFFICE O/P EST MOD 30 MIN: CPT | Performed by: OPHTHALMOLOGY

## 2024-10-11 ASSESSMENT — INTRAOCULAR PRESSURE
OS: 12
OD: 10

## 2025-01-08 ENCOUNTER — OFFICE VISIT (OUTPATIENT)
Dept: URBAN - METROPOLITAN AREA CLINIC 41 | Facility: CLINIC | Age: 89
End: 2025-01-08
Payer: MEDICARE

## 2025-01-08 DIAGNOSIS — Z96.1 PRESENCE OF INTRAOCULAR LENS: ICD-10-CM

## 2025-01-08 DIAGNOSIS — H40.9 GLAUCOMA: ICD-10-CM

## 2025-01-08 DIAGNOSIS — H43.813 VITREOUS DEGENERATION, BILATERAL: ICD-10-CM

## 2025-01-08 DIAGNOSIS — H35.3231 EXUDATIVE AGE-RELATED MACULAR DEGENERATION, BILATERAL, WITH ACTIVE CHOROIDAL NEOVASCULARIZATION: Primary | ICD-10-CM

## 2025-01-08 DIAGNOSIS — H04.123 DRY EYE SYNDROME OF BILATERAL LACRIMAL GLANDS: ICD-10-CM

## 2025-01-08 PROCEDURE — 92134 CPTRZ OPH DX IMG PST SGM RTA: CPT | Performed by: OPHTHALMOLOGY

## 2025-01-08 PROCEDURE — 99214 OFFICE O/P EST MOD 30 MIN: CPT | Performed by: OPHTHALMOLOGY

## 2025-01-08 ASSESSMENT — INTRAOCULAR PRESSURE
OD: 16
OS: 18

## 2025-04-02 ENCOUNTER — OFFICE VISIT (OUTPATIENT)
Dept: URBAN - METROPOLITAN AREA CLINIC 41 | Facility: CLINIC | Age: 89
End: 2025-04-02
Payer: MEDICARE

## 2025-04-02 DIAGNOSIS — Z96.1 PRESENCE OF INTRAOCULAR LENS: ICD-10-CM

## 2025-04-02 DIAGNOSIS — H04.123 DRY EYE SYNDROME OF BILATERAL LACRIMAL GLANDS: ICD-10-CM

## 2025-04-02 DIAGNOSIS — H40.9 GLAUCOMA: ICD-10-CM

## 2025-04-02 DIAGNOSIS — H35.3231 EXUDATIVE AGE-RELATED MACULAR DEGENERATION, BILATERAL, WITH ACTIVE CHOROIDAL NEOVASCULARIZATION: Primary | ICD-10-CM

## 2025-04-02 DIAGNOSIS — H43.813 VITREOUS DEGENERATION, BILATERAL: ICD-10-CM

## 2025-04-02 PROCEDURE — 92134 CPTRZ OPH DX IMG PST SGM RTA: CPT | Performed by: OPHTHALMOLOGY

## 2025-04-02 PROCEDURE — 99214 OFFICE O/P EST MOD 30 MIN: CPT | Performed by: OPHTHALMOLOGY

## 2025-04-02 ASSESSMENT — INTRAOCULAR PRESSURE
OD: 11
OS: 12

## 2025-07-09 ENCOUNTER — OFFICE VISIT (OUTPATIENT)
Dept: URBAN - METROPOLITAN AREA CLINIC 41 | Facility: CLINIC | Age: 89
End: 2025-07-09
Payer: MEDICARE

## 2025-07-09 DIAGNOSIS — H04.123 DRY EYE SYNDROME OF BILATERAL LACRIMAL GLANDS: ICD-10-CM

## 2025-07-09 DIAGNOSIS — H40.9 GLAUCOMA: ICD-10-CM

## 2025-07-09 DIAGNOSIS — Z96.1 PRESENCE OF INTRAOCULAR LENS: ICD-10-CM

## 2025-07-09 DIAGNOSIS — H35.3231 EXUDATIVE AGE-RELATED MACULAR DEGENERATION, BILATERAL, WITH ACTIVE CHOROIDAL NEOVASCULARIZATION: Primary | ICD-10-CM

## 2025-07-09 DIAGNOSIS — H43.813 VITREOUS DEGENERATION, BILATERAL: ICD-10-CM

## 2025-07-09 PROCEDURE — 92134 CPTRZ OPH DX IMG PST SGM RTA: CPT | Performed by: OPHTHALMOLOGY

## 2025-07-09 PROCEDURE — 92012 INTRM OPH EXAM EST PATIENT: CPT | Performed by: OPHTHALMOLOGY

## 2025-07-09 ASSESSMENT — INTRAOCULAR PRESSURE
OS: 15
OD: 16